# Patient Record
Sex: MALE | Race: WHITE
[De-identification: names, ages, dates, MRNs, and addresses within clinical notes are randomized per-mention and may not be internally consistent; named-entity substitution may affect disease eponyms.]

---

## 2022-08-28 ENCOUNTER — HOSPITAL ENCOUNTER (EMERGENCY)
Dept: HOSPITAL 54 - ER | Age: 67
LOS: 1 days | Discharge: HOME | End: 2022-08-29
Payer: MEDICARE

## 2022-08-28 VITALS — HEIGHT: 75 IN | WEIGHT: 273 LBS | BODY MASS INDEX: 33.94 KG/M2
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
## 2022-08-28 DIAGNOSIS — I48.91: ICD-10-CM
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
## 2022-08-28 DIAGNOSIS — S91.204A: Primary | ICD-10-CM
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
## 2022-08-28 PROCEDURE — 90471 IMMUNIZATION ADMIN: CPT

## 2022-08-28 PROCEDURE — A6403 STERILE GAUZE>16 <= 48 SQ IN: HCPCS

## 2022-08-28 PROCEDURE — 90715 TDAP VACCINE 7 YRS/> IM: CPT

## 2022-08-28 PROCEDURE — 99283 EMERGENCY DEPT VISIT LOW MDM: CPT

## 2022-08-28 RX ADMIN — CLOSTRIDIUM TETANI TOXOID ANTIGEN (FORMALDEHYDE INACTIVATED), CORYNEBACTERIUM DIPHTHERIAE TOXOID ANTIGEN (FORMALDEHYDE INACTIVATED), BORDETELLA PERTUSSIS TOXOID ANTIGEN (GLUTARALDEHYDE INACTIVATED), BORDETELLA PERTUSSIS FILAMENTOUS HEMAGGLUTININ ANTIGEN (FORMALDEHYDE INACTIVATED), BORDETELLA PERTUSSIS PERTACTIN ANTIGEN, AND BORDETELLA PERTUSSIS FIMBRIAE 2/3 ANTIGEN ONE ML: 5; 2; 2.5; 5; 3; 5 INJECTION, SUSPENSION INTRAMUSCULAR at 23:28

## 2022-08-28 NOTE — NUR
AILIN PATEL PROVIDING THE PROPER BANDAGES AND GUAZES NEEDED FOR THE ASEPTIC WOUND 
CARE CLEANING TO GALA SUN AS SHE CAREFULLY AND ASEPTICALLY CLEANS THE WOUND 
IN BED NUMBER TEN.

## 2022-08-28 NOTE — NUR
THE RN STUDENTS OF [facility] AT BEDSIDE ASSISTING AND LEARNING THE 
ASEPTIC TECHNIQUES OF PROPER WOUND CARE.

## 2022-08-28 NOTE — NUR
EMT CORINNE AT BEDSIDE PERFORMING ASEPTIC CLEANING TECHNIQUES TO THE OPEN 
LACERATION OF THE RIGHT FOURTH TOE ALSO KNOWN AS THE "RING TOE"

-------------------------------------------------------------------------------

Addendum: 08/28/22 at 3983 by SAYRA

-------------------------------------------------------------------------------

NITIN GUTIERREZ THE *FOURTH DIGIT TOE*

## 2022-08-29 VITALS — DIASTOLIC BLOOD PRESSURE: 85 MMHG | SYSTOLIC BLOOD PRESSURE: 136 MMHG

## 2022-08-29 NOTE — NUR
-------------------------------------------------------------------------------

            *** Note shahrzad in EDM - 08/29/22 at 0017 by ERICA ***             

-------------------------------------------------------------------------------

Patient discharged to home in stable condition. Written and verbal after care 
instructions given. Patient verbalizes understanding of instruction. IV 
removed. Catheter intact and site benign. Pressure and 4x4 applied to site. No 
bleeding noted. PT ambulatory with a steady gait
